# Patient Record
Sex: FEMALE | Race: BLACK OR AFRICAN AMERICAN | NOT HISPANIC OR LATINO | Employment: OTHER | URBAN - METROPOLITAN AREA
[De-identification: names, ages, dates, MRNs, and addresses within clinical notes are randomized per-mention and may not be internally consistent; named-entity substitution may affect disease eponyms.]

---

## 2024-11-11 ENCOUNTER — TELEPHONE (OUTPATIENT)
Dept: INTERNAL MEDICINE | Facility: CLINIC | Age: 39
End: 2024-11-11
Payer: MEDICARE

## 2024-11-11 NOTE — TELEPHONE ENCOUNTER
Pt is scheduled for an appointment with Dr Luz for tomcorry. Per her Aunt, she is having transportation problems. Pt's mom  has an appointment for 11/29/2024 and is checking to see if pt can come in on this date. I informed her that Dr Luz doesn't have any opening until the 2025 .She wants to keep this appointment for tomm and she will try to get some one to bring them.

## 2024-11-11 NOTE — TELEPHONE ENCOUNTER
----- Message from Karolina sent at 11/11/2024  1:20 PM CST -----  Contact: Brianna/aunt  Pt aunt is calling to rescheduled the appt on 11/12.please call back at [phone]          Thanks  FRANCOIS